# Patient Record
Sex: FEMALE | Race: WHITE
[De-identification: names, ages, dates, MRNs, and addresses within clinical notes are randomized per-mention and may not be internally consistent; named-entity substitution may affect disease eponyms.]

---

## 2021-06-27 ENCOUNTER — HOSPITAL ENCOUNTER (EMERGENCY)
Dept: HOSPITAL 11 - JP.ED | Age: 66
Discharge: HOME | End: 2021-06-27
Payer: MEDICARE

## 2021-06-27 DIAGNOSIS — M10.072: Primary | ICD-10-CM

## 2021-06-27 DIAGNOSIS — Z79.82: ICD-10-CM

## 2021-06-27 DIAGNOSIS — Z79.899: ICD-10-CM

## 2021-06-27 DIAGNOSIS — I10: ICD-10-CM

## 2021-06-27 DIAGNOSIS — E66.9: ICD-10-CM

## 2021-06-27 PROCEDURE — 96372 THER/PROPH/DIAG INJ SC/IM: CPT

## 2021-06-27 PROCEDURE — 84550 ASSAY OF BLOOD/URIC ACID: CPT

## 2021-06-27 PROCEDURE — 85025 COMPLETE CBC W/AUTO DIFF WBC: CPT

## 2021-06-27 PROCEDURE — 99283 EMERGENCY DEPT VISIT LOW MDM: CPT

## 2021-06-27 PROCEDURE — 36415 COLL VENOUS BLD VENIPUNCTURE: CPT

## 2021-06-27 NOTE — EDM.PDOC
ED HPI GENERAL MEDICAL PROBLEM





- General


Chief Complaint: Lower Extremity Injury/Pain


Stated Complaint: POSSIBLE GOUT ON RT FOOT


Time Seen by Provider: 06/27/21 13:45


Source of Information: Reports: Patient


History Limitations: Reports: No Limitations





- History of Present Illness


INITIAL COMMENTS - FREE TEXT/NARRATIVE: 





pt has a history of gout and 1 week ago she had a flare in her left great toe. 

She did take a course of relatively low dose predisone and did not get good 

relief. 


Onset: Gradual


Duration: Day(s):


Location: Reports: Lower Extremity, Left


Associated Symptoms: Reports: No Other Symptoms





- Related Data


                                    Allergies











Allergy/AdvReac Type Severity Reaction Status Date / Time


 


No Known Allergies Allergy   Verified 06/27/21 13:24











Home Meds: 


                                    Home Meds





Albuterol Sulfate [Proair Hfa] 1 puff IH ASDIRECTED 06/27/21 [History]


Aspirin [Halfprin] 81 mg PO DAILY 06/27/21 [History]


Calcium Carbonate [Calcium] 500 mg PO DAILY 06/27/21 [History]


Chlorpheniramine Maleate 4 mg PO DAILY 06/27/21 [History]


Fish Oil/Omega-3 Fatty Acids [Fish Oil 1,000 MG] 2 tab PO BID 06/27/21 [History]


Fluticasone Propionate [Flonase] 1 spray IH ASDIRECTED 06/27/21 [History]


Ibuprofen 800 mg PO ASDIRECTED 06/27/21 [History]


Levothyroxine 112 mcg PO ACBREAKFAST 06/27/21 [History]


Propranolol [Inderal] 10 mg PO BID 06/27/21 [History]


Valsartan/Hydrochlorothiazide [Valsartan-Hctz 160-25 mg Tab] 1 tab PO DAILY 06/2 7/21 [History]


atorvaSTATin [Lipitor] 10 mg PO BEDTIME 06/27/21 [History]











Past Medical History


HEENT History: Reports: Impaired Vision


Cardiovascular History: Reports: Hypertension


Respiratory History: Reports: Asthma


Gastrointestinal History: Reports: None


Genitourinary History: Reports: None


OB/GYN History: Reports: Pregnancy


Musculoskeletal History: Reports: Back Pain, Chronic, Gout


Neurological History: Reports: Migraines


Endocrine/Metabolic History: Reports: Hypothyroidism, Obesity/BMI 30+





- Past Surgical History


Head Surgeries/Procedures: Reports: None


HEENT Surgical History: Reports: Tonsillectomy


Cardiovascular Surgical History: Reports: None


Respiratory Surgical History: Reports: None


GI Surgical History: Reports: Colonoscopy


Female  Surgical History: Reports: None


Endocrine Surgical History: Reports: None


Neurological Surgical History: Reports: None


Musculoskeletal Surgical History: Reports: None


Dermatological Surgical History: Reports: None





Social & Family History





- Tobacco Use


Tobacco Use Status *Q: Never Tobacco User


Second Hand Smoke Exposure: No





- Caffeine Use


Caffeine Use: Reports: Tea





- Recreational Drug Use


Recreational Drug Use: No





Review of Systems





- Review of Systems


Review Of Systems: See Below


Constitutional: Reports: No Symptoms


Eyes: Reports: No Symptoms


Ears: Reports: No Symptoms


Nose: Reports: No Symptoms


Mouth/Throat: Reports: No Symptoms


Respiratory: Reports: No Symptoms


Cardiovascular: Reports: No Symptoms


Musculoskeletal: Reports: Other ( severe pain in left great toe. )





ED EXAM, GENERAL





- Physical Exam


Exam: See Below


Free Text/Narrative:: 





pt has a very painful left great toe. Uric acid is elevated. 


Exam Limited By: No Limitations


General Appearance: Alert, Anxious, Mild Distress


Extremities: Other ( left great toe is red inflamed and very tender at the m-p 

joint. )


Neurological: Alert, Oriented, Normal Cognition





Course





- Vital Signs


Last Recorded V/S: 


                                Last Vital Signs











Temp  36.4 C   06/27/21 13:32


 


Pulse  57 L  06/27/21 13:32


 


Resp  18   06/27/21 13:32


 


BP  176/71 H  06/27/21 13:32


 


Pulse Ox  98   06/27/21 13:32














- Orders/Labs/Meds


Labs: 


                                Laboratory Tests











  06/27/21 06/27/21 Range/Units





  13:35 13:35 


 


WBC   9.6  (4.5-11.0)  K/uL


 


RBC   5.21  (3.30-5.50)  M/uL


 


Hgb   14.9  (12.0-15.0)  g/dL


 


Hct   44.8  (36.0-48.0)  %


 


MCV   86  (80-98)  fL


 


MCH   29  (27-31)  pg


 


MCHC   33  (32-36)  %


 


Plt Count   299  (150-400)  K/uL


 


Neut % (Auto)   72.7 H  (36-66)  %


 


Lymph % (Auto)   18.6 L  (24-44)  %


 


Mono % (Auto)   6.7 H  (2-6)  %


 


Eos % (Auto)   1.3 L  (2-4)  %


 


Baso % (Auto)   0.7  (0-1)  %


 


Uric Acid  9.2 H   (2.6-6.2)  mg/dL











Meds: 


Medications














Discontinued Medications














Generic Name Dose Route Start Last Admin





  Trade Name Isaac  PRN Reason Stop Dose Admin


 


Triamcinolone Acetonide  60 mg  06/27/21 14:48  06/27/21 14:56





  Triamcinolone Acetonide 40 Mg/Ml 1 Ml Sdv  IM  06/27/21 14:49  60 mg





  ASDIRECTED ONE   Administration














- Re-Assessments/Exams


Free Text/Narrative Re-Assessment/Exam: 





06/27/21 15:01


pt was given kenalog 60 mg im. 





Departure





- Departure


Time of Disposition: 14:56


Disposition: Home, Self-Care 01


Condition: Fair


Clinical Impression: 


 Gout








- Discharge Information


Instructions:  Low-Purine Eating Plan, Calcium Pyrophosphate Deposition


Referrals: 


SERGIO ESPARZA [Other]


Forms:  ED Department Discharge


Care Plan Goals: 


 elevate, cool pack, indocin 50 mg tid for 3 days with food and then indocin 50 

mg bid until pain is better.  consider talking to your physian regarding a med 

top keep the uric acid down. 





Sepsis Event Note (ED)





- Evaluation


Sepsis Screening Result: No Definite Risk





- Focused Exam


Vital Signs: 


                                   Vital Signs











  Temp Pulse Resp BP Pulse Ox


 


 06/27/21 13:32  36.4 C  57 L  18  176/71 H  98


 


 06/27/21 13:23  36.4 C  57 L  18  176/71 H  98